# Patient Record
Sex: FEMALE | Race: ASIAN | NOT HISPANIC OR LATINO | URBAN - METROPOLITAN AREA
[De-identification: names, ages, dates, MRNs, and addresses within clinical notes are randomized per-mention and may not be internally consistent; named-entity substitution may affect disease eponyms.]

---

## 2017-09-14 ENCOUNTER — EMERGENCY (EMERGENCY)
Facility: HOSPITAL | Age: 27
LOS: 1 days | Discharge: HOME | End: 2017-09-14
Attending: EMERGENCY MEDICINE | Admitting: EMERGENCY MEDICINE
Payer: MEDICAID

## 2017-09-14 VITALS
SYSTOLIC BLOOD PRESSURE: 136 MMHG | TEMPERATURE: 98 F | RESPIRATION RATE: 18 BRPM | OXYGEN SATURATION: 99 % | HEART RATE: 89 BPM | DIASTOLIC BLOOD PRESSURE: 74 MMHG

## 2017-09-14 PROCEDURE — 72100 X-RAY EXAM L-S SPINE 2/3 VWS: CPT | Mod: 26

## 2017-09-14 PROCEDURE — 99284 EMERGENCY DEPT VISIT MOD MDM: CPT

## 2017-09-14 PROCEDURE — 72170 X-RAY EXAM OF PELVIS: CPT | Mod: 26

## 2017-09-14 PROCEDURE — 73130 X-RAY EXAM OF HAND: CPT | Mod: 26,LT

## 2017-09-14 RX ORDER — IBUPROFEN 200 MG
600 TABLET ORAL ONCE
Qty: 0 | Refills: 0 | Status: DISCONTINUED | OUTPATIENT
Start: 2017-09-14 | End: 2017-09-18

## 2017-09-14 NOTE — ED PROVIDER NOTE - PHYSICAL EXAMINATION
ATTENDING PHYSICAL EXAM DR. IRIZARRY ***GEN - NAD; well appearing; A+O x3 ***HEAD - NC/AT ***EYES/NOSE - PERRL, EOMI, mucous membranes moist, no discharge ***THROAT: Oral cavity and pharynx normal. No inflammation, swelling, exudate, or lesions.  ***NECK: Neck supple, non-tender without lymphadenopathy, no masses, no thyromegaly.   ***PULMONARY - CTA b/l, symmetric breath sounds. ***CARDIAC -s1s2, RRR, no M,G,R  ***ABDOMEN - +BS, ND, NT, soft, no guarding, no rebound, no masses   ***BACK - no CVA tenderness, Normal  spine +right lumbar paraspinal tenderness, no midline tenderness ***EXTREMITIES - symmetric pulses, 2+ dp, capillary refill < 2 seconds, no clubbing, no cyanosis, no edema, left hand dorsum abrasion no snuff box tend, no pain of thumb with axial loading, no deformity ***SKIN - no rash  ***NEUROLOGIC - alert, CN 2-12 intact, reflexes nl, sensation nl, coordination nl, finger to nose nl, romberg negative, motor 5/5 RUE/LUE/RLE/LLE/EHL/Plantar flexion, no pronator drift, gait nl, +SLR rigth side

## 2017-09-14 NOTE — ED PROVIDER NOTE - OBJECTIVE STATEMENT
26 y/o F w/ no significant PMHx, presents to the ED c/o right lumbar back pain radiating down to the right thigh s/p slip and fall yesterday. Pain is rated 7/10 in severity. Pt states she fell down the stairs (about 3 steps) and landed on buttock, back and left hand. Endorses use of Advil 400mg last night w/ minimal relief. Pt states she currently is on Levaquin for UTI, dx'd by PMD. Denies head trauma, LOC, numbness/tingling, saddle anesthesia, paresthesia, urinary/fecal incontinence, hematuria or any other complaints. NKDA.

## 2017-09-14 NOTE — ED PROVIDER NOTE - MEDICAL DECISION MAKING DETAILS
Back pain most consistent with musculoskeletal pain, no fever, no hx of IVDA, no trauma, no weakness, no bowel or bladder incontinence  1) pain control xray pelvis, lumbar and left hand  2) reassess

## 2017-09-14 NOTE — ED PROVIDER NOTE - CHPI ED SYMPTOMS NEG
no hematuria/no tingling/no numbness/no loss of consciousness no loss of consciousness/no hematuria/no numbness/no bleeding/no tingling

## 2017-09-14 NOTE — ED PROVIDER NOTE - PROGRESS NOTE DETAILS
ATTG NOTE DR. IRIZARRY Preliminary read xrays - no acute pathology - pt to follow up with PMD and continue NSADS PRN pain.